# Patient Record
Sex: MALE
[De-identification: names, ages, dates, MRNs, and addresses within clinical notes are randomized per-mention and may not be internally consistent; named-entity substitution may affect disease eponyms.]

---

## 2018-10-12 ENCOUNTER — HOSPITAL ENCOUNTER (EMERGENCY)
Dept: HOSPITAL 9 - MADERS | Age: 58
LOS: 1 days | Discharge: ANOTHER HOSPITAL | End: 2018-10-13
Payer: COMMERCIAL

## 2018-10-12 DIAGNOSIS — Z79.84: ICD-10-CM

## 2018-10-12 DIAGNOSIS — E11.9: ICD-10-CM

## 2018-10-12 DIAGNOSIS — T63.061A: Primary | ICD-10-CM

## 2018-10-12 LAB
ALBUMIN SERPL BCG-MCNC: 4.1 G/DL (ref 3.5–5)
ALP SERPL-CCNC: 93 U/L (ref 40–150)
ALT SERPL W P-5'-P-CCNC: 29 U/L (ref 8–55)
ANION GAP SERPL CALC-SCNC: 12 MMOL/L (ref 10–20)
AST SERPL-CCNC: 21 U/L (ref 5–34)
BASOPHILS # BLD AUTO: 0.1 THOU/UL (ref 0–0.2)
BASOPHILS NFR BLD AUTO: 1.2 % (ref 0–1)
BILIRUB SERPL-MCNC: 0.3 MG/DL (ref 0.2–1.2)
BUN SERPL-MCNC: 19 MG/DL (ref 8.4–25.7)
CALCIUM SERPL-MCNC: 8.8 MG/DL (ref 7.8–10.44)
CHLORIDE SERPL-SCNC: 107 MMOL/L (ref 98–107)
CO2 SERPL-SCNC: 21 MMOL/L (ref 22–29)
CREAT CL PREDICTED SERPL C-G-VRATE: 0 ML/MIN (ref 70–130)
EOSINOPHIL # BLD AUTO: 0.4 THOU/UL (ref 0–0.7)
EOSINOPHIL NFR BLD AUTO: 6.6 % (ref 0–10)
FIBRINOGEN PPP-MCNC: 365 MG/DL (ref 253–463)
GLOBULIN SER CALC-MCNC: 2.9 G/DL (ref 2.4–3.5)
GLUCOSE SERPL-MCNC: 179 MG/DL (ref 70–105)
HGB BLD-MCNC: 14.2 G/DL (ref 14–18)
INR PPP: 1
LYMPHOCYTES # BLD AUTO: 1.9 THOU/UL (ref 1.2–3.4)
LYMPHOCYTES NFR BLD AUTO: 31.4 % (ref 21–51)
MCH RBC QN AUTO: 29.4 PG (ref 27–31)
MCV RBC AUTO: 87 FL (ref 78–98)
MONOCYTES # BLD AUTO: 0.4 THOU/UL (ref 0.11–0.59)
MONOCYTES NFR BLD AUTO: 6.6 % (ref 0–10)
NEUTROPHILS # BLD AUTO: 3.2 THOU/UL (ref 1.4–6.5)
NEUTROPHILS NFR BLD AUTO: 54.2 % (ref 42–75)
PLATELET # BLD AUTO: 242 THOU/UL (ref 130–400)
POTASSIUM SERPL-SCNC: 3.7 MMOL/L (ref 3.5–5.1)
PROTHROMBIN TIME: 13 SEC (ref 12–14.7)
RBC # BLD AUTO: 4.85 MILL/UL (ref 4.7–6.1)
SODIUM SERPL-SCNC: 136 MMOL/L (ref 136–145)
WBC # BLD AUTO: 5.9 THOU/UL (ref 4.8–10.8)

## 2018-10-12 PROCEDURE — 80053 COMPREHEN METABOLIC PANEL: CPT

## 2018-10-12 PROCEDURE — 90471 IMMUNIZATION ADMIN: CPT

## 2018-10-12 PROCEDURE — 96375 TX/PRO/DX INJ NEW DRUG ADDON: CPT

## 2018-10-12 PROCEDURE — 85384 FIBRINOGEN ACTIVITY: CPT

## 2018-10-12 PROCEDURE — 85025 COMPLETE CBC W/AUTO DIFF WBC: CPT

## 2018-10-12 PROCEDURE — 90715 TDAP VACCINE 7 YRS/> IM: CPT

## 2018-10-12 PROCEDURE — 85610 PROTHROMBIN TIME: CPT

## 2018-10-12 PROCEDURE — 96374 THER/PROPH/DIAG INJ IV PUSH: CPT

## 2018-10-13 ENCOUNTER — HOSPITAL ENCOUNTER (INPATIENT)
Dept: HOSPITAL 92 - ERS | Age: 58
Discharge: HOME | DRG: 918 | End: 2018-10-13
Attending: HOSPITALIST | Admitting: HOSPITALIST
Payer: SELF-PAY

## 2018-10-13 VITALS — SYSTOLIC BLOOD PRESSURE: 118 MMHG | TEMPERATURE: 98.1 F | DIASTOLIC BLOOD PRESSURE: 72 MMHG

## 2018-10-13 VITALS — BODY MASS INDEX: 31.6 KG/M2

## 2018-10-13 DIAGNOSIS — Z79.84: ICD-10-CM

## 2018-10-13 DIAGNOSIS — E11.9: ICD-10-CM

## 2018-10-13 DIAGNOSIS — T63.091A: Primary | ICD-10-CM

## 2018-10-13 LAB
ALBUMIN SERPL BCG-MCNC: 3.8 G/DL (ref 3.5–5)
ALP SERPL-CCNC: 69 U/L (ref 40–150)
ALT SERPL W P-5'-P-CCNC: 22 U/L (ref 8–55)
ANION GAP SERPL CALC-SCNC: 13 MMOL/L (ref 10–20)
APTT PPP: 27.2 SEC (ref 22.9–36.1)
APTT PPP: 29.3 SEC (ref 22.9–36.1)
AST SERPL-CCNC: 16 U/L (ref 5–34)
BASOPHILS # BLD AUTO: 0 THOU/UL (ref 0–0.2)
BASOPHILS NFR BLD AUTO: 0.5 % (ref 0–1)
BILIRUB SERPL-MCNC: 0.5 MG/DL (ref 0.2–1.2)
BUN SERPL-MCNC: 17 MG/DL (ref 8.4–25.7)
CALCIUM SERPL-MCNC: 8.5 MG/DL (ref 7.8–10.44)
CHLORIDE SERPL-SCNC: 105 MMOL/L (ref 98–107)
CO2 SERPL-SCNC: 23 MMOL/L (ref 22–29)
CREAT CL PREDICTED SERPL C-G-VRATE: 113 ML/MIN (ref 70–130)
EOSINOPHIL # BLD AUTO: 0.3 THOU/UL (ref 0–0.7)
EOSINOPHIL NFR BLD AUTO: 3.3 % (ref 0–10)
FIBRINOGEN PPP-MCNC: 351 MG/DL (ref 253–463)
FIBRINOGEN PPP-MCNC: 380 MG/DL (ref 253–463)
GLOBULIN SER CALC-MCNC: 2.8 G/DL (ref 2.4–3.5)
GLUCOSE SERPL-MCNC: 138 MG/DL (ref 70–105)
HGB BLD-MCNC: 14.9 G/DL (ref 14–18)
INR PPP: 1
INR PPP: 1
LYMPHOCYTES # BLD: 1.7 THOU/UL (ref 1.2–3.4)
LYMPHOCYTES NFR BLD AUTO: 21.6 % (ref 21–51)
MCH RBC QN AUTO: 29.9 PG (ref 27–31)
MCV RBC AUTO: 89.9 FL (ref 78–98)
MONOCYTES # BLD AUTO: 0.4 THOU/UL (ref 0.11–0.59)
MONOCYTES NFR BLD AUTO: 5.4 % (ref 0–10)
NEUTROPHILS # BLD AUTO: 5.5 THOU/UL (ref 1.4–6.5)
NEUTROPHILS NFR BLD AUTO: 69.3 % (ref 42–75)
PLATELET # BLD AUTO: 231 THOU/UL (ref 130–400)
PLATELET # BLD AUTO: 236 THOU/UL (ref 130–400)
POTASSIUM SERPL-SCNC: 4.1 MMOL/L (ref 3.5–5.1)
PROTHROMBIN TIME: 13.3 SEC (ref 12–14.7)
PROTHROMBIN TIME: 13.3 SEC (ref 12–14.7)
RBC # BLD AUTO: 4.98 MILL/UL (ref 4.7–6.1)
SODIUM SERPL-SCNC: 137 MMOL/L (ref 136–145)
WBC # BLD AUTO: 7.9 THOU/UL (ref 4.8–10.8)

## 2018-10-13 PROCEDURE — 36415 COLL VENOUS BLD VENIPUNCTURE: CPT

## 2018-10-13 PROCEDURE — 85610 PROTHROMBIN TIME: CPT

## 2018-10-13 PROCEDURE — 85384 FIBRINOGEN ACTIVITY: CPT

## 2018-10-13 PROCEDURE — 85730 THROMBOPLASTIN TIME PARTIAL: CPT

## 2018-10-13 PROCEDURE — 85049 AUTOMATED PLATELET COUNT: CPT

## 2018-10-13 PROCEDURE — 80053 COMPREHEN METABOLIC PANEL: CPT

## 2018-10-13 PROCEDURE — 85025 COMPLETE CBC W/AUTO DIFF WBC: CPT

## 2018-10-13 NOTE — SS
DATE OF ADMISSION:  10/13/2018

 

DATE OF DISCHARGE:  10/13/2018

 

PRIMARY CARE PHYSICIAN:  Dr. Marie at [place].

 

CHIEF COMPLAINT:  Snake bite with envenomation to left foot.

 

The patient was seen in [facility], started on CroFab and transferred to [facility] and admitted here for observation.

 

HISTORY OF PRESENT ILLNESS:  The patient sustained a snake bite from a 
copperhead snake with envenomation yesterday afternoon, and went to the ER in 
[place] for evaluation.  While in the ER in [place], the patient 
experienced increased swelling and pain, which progressed to his midcalf.  The 
patient also complained of nausea and dizziness, and the ED physician decided 
to start an infusion of antivenon, CroFab.  1 dose of CroFab was administered 
and he was transferred here for admission and continued monitoring.  Initial 
labs were within normal limits and we monitored him on a medical unit.

 

PAST MEDICAL HISTORY:  Patient has diabetes mellitus, type II which is 
controlled with oral medication.

 

PAST SURGICAL HISTORY:  Patient reports right abdomen hernia repair in  and 
right ORIF ulna shaft fracture in May 2008.

 

CURRENT MEDICATIONS:  Patient takes metformin 500 mg p.o. b.i.d.

 

FAMILY HISTORY:  Includes a father with a history of diabetes mellitus, 2, and 
he is .  Mother has a history of cancer.

 

SOCIAL HISTORY:  He is  and lives at home with his family.  He is a F[employer] 
[employer].  He is a nonsmoker.  He denies alcohol or drug use.

 

REVIEW OF SYSTEMS:  Constitutional:  Negative review of systems.  Historian 
denies chills or fever.  Eyes:  Negative system review.  ENT:  Negative ears, 
nose, and throat.  Cardiovascular:  Historian denies any chest pain or any 
palpitations.  Respiratory:  Negative review of systems, Historian denies cough 
or shortness of breath.  GI:  Currently, the patient denies any abdominal pain 
or any nausea.  Denies diarrhea.  Musculoskeletal:  The patient reports pain 
and swelling with erythema to the left foot and ankle.  Skin:  Patient reports 
erythema and tenderness to left foot and ankle otherwise the Skin dry, normal 
in color.  Endocrine:  Negative review of systems.  Heme/Lymphatic:  Normal 
review of systems.  PSYCHIATRIC:  Negative review of systems.  Notes all other 
systems reviewed and negative except as described above.

 

PHYSICAL EXAMINATION:

CONSTITUTIONAL:  Vital signs have been reviewed.  Patient appears nontoxic, 
though alert and oriented.

GENERAL:  Patient is in no apparent distress.

HEAD: Exam findings is atraumatic, normocephalic.

HEENT:    Eyes:  Eyes are PERRLA.  Eyelids are normal to inspection.  
Intraocular muscles are intact.  ENT:  Nose appears normal.  No deformities nor 
bleeding from nares.  Mouth exam is normal.  Mucous membranes are moist.

NECK FINDINGS:  Abnormal range of motion.  Trachea is midline.

RESPIRATORY:  Chest:  Breath sounds are clear.  No wheezing, no rales.  
Symmetrical movement and expansion.

CARDIOVASCULAR FINDINGS:  Heart rate is normal rate and rhythm.  Heart sounds 
are normal.

ABDOMINAL/MALE:  Nontender, bowel sounds x4 quadrants.  No mass.  No peritoneal 
signs.

BACK:  Includes normal inspection, normal range of motion.  No CVA tenderness.

EXTREMITIES:  Upper extremity, normal range of motion.  Lower extremity, 
lateral side of left foot at base of the left fifth toe with 3 puncture marks 
significant for swelling, mild erythema, tenderness to left foot, ankle, and 
lower calf with markings of progression of swelling.

NEUROLOGIC EXAM FINDINGS:  The patient is oriented to person, place, and time.  
Speech is normal.  No focal deficits or sensory deficits.

SKIN:  He has +2 edema and mild erythema to left foot and ankle, otherwise dry, 
warm, and normal color.

LYMPHATICS:  Normal exam.

PSYCHIATRIC:  Patient is oriented to person, place and time, and normal affect.

 

LABORATORY DATA:  White blood cell count on discharge 7.9, hemoglobin 14.9, 
hematocrit 44.7, platelet count was 231.  PT on discharge 13.3, INR 1, APTT is 
29.3 and fibrinogen is 351.  Sodium 137, potassium 4.1, BUN 17, creatinine 0.87
, estimated GFR is 90, glucose 138, calcium 8.5.  Liver enzymes are normal.  
The patient received no imaging while in the hospital.

 

ASSESSMENT:

1.  Snake bite with envenomation, was observed for over 12 hours.  Laboratory 
values remained normal.

2.  Diabetes, type 2, maintained on Glucophage.

 

PLAN:  To monitor the patient as stated above, since labs remained within 
normal range.  Patient denied any increased pain or tenderness.  A decision was 
made to send the patient home with pain medication, tramadol #20 and close 
followup with his primary care in [place].  Return precautions were given.
  The patient agreed to plan.

 

[facility]

## 2018-10-13 NOTE — PDOC.EVN
Event Note





- Event Note


Event Note: 





I have examined and discussed patient with Monica LEDEZMA regarding the 

admission for snake envenomation by Copperhead bite. Received 1 dose of Crofab 

antivenom and monitored for progression. No progression noted and swelling 

localized to dorsum L foot and ankle. Discussed treatment and home remedies 

once discharged. Pt and wife verbalized understanding and agreement with plan. 

Home today. Please see dictated H&P from Monica Barreto for details regarding 

full history.